# Patient Record
Sex: FEMALE | Race: WHITE
[De-identification: names, ages, dates, MRNs, and addresses within clinical notes are randomized per-mention and may not be internally consistent; named-entity substitution may affect disease eponyms.]

---

## 2017-02-13 ENCOUNTER — HOSPITAL ENCOUNTER (OUTPATIENT)
Dept: HOSPITAL 45 - C.LAB1850 | Age: 82
Discharge: HOME | End: 2017-02-13
Attending: INTERNAL MEDICINE
Payer: COMMERCIAL

## 2017-02-13 DIAGNOSIS — I10: Primary | ICD-10-CM

## 2017-02-13 LAB
ANION GAP SERPL CALC-SCNC: 11 MMOL/L (ref 3–11)
BUN SERPL-MCNC: 26 MG/DL (ref 7–18)
BUN/CREAT SERPL: 21.8 (ref 10–20)
CALCIUM SERPL-MCNC: 8.8 MG/DL (ref 8.5–10.1)
CHLORIDE SERPL-SCNC: 102 MMOL/L (ref 98–107)
CO2 SERPL-SCNC: 26 MMOL/L (ref 21–32)
CREAT SERPL-MCNC: 1.2 MG/DL (ref 0.6–1.2)
GLUCOSE SERPL-MCNC: 77 MG/DL (ref 70–99)
POTASSIUM SERPL-SCNC: 4.1 MMOL/L (ref 3.5–5.1)
SODIUM SERPL-SCNC: 139 MMOL/L (ref 136–145)

## 2017-02-17 ENCOUNTER — HOSPITAL ENCOUNTER (OUTPATIENT)
Dept: HOSPITAL 45 - C.MRI | Age: 82
Discharge: HOME | End: 2017-02-17
Attending: INTERNAL MEDICINE
Payer: COMMERCIAL

## 2017-02-17 DIAGNOSIS — H92.02: ICD-10-CM

## 2017-02-17 DIAGNOSIS — G50.0: Primary | ICD-10-CM

## 2017-02-17 DIAGNOSIS — R25.1: ICD-10-CM

## 2017-02-17 DIAGNOSIS — R20.0: ICD-10-CM

## 2017-02-17 DIAGNOSIS — H91.90: ICD-10-CM

## 2017-02-17 NOTE — DIAGNOSTIC IMAGING REPORT
BRAIN COMBO FOR TRIGEMINAL



CLINICAL HISTORY: Hearing loss. Intermittent left-sided jaw pain and numbness.  

 



COMPARISON STUDY:  MRI of the brain July 12, 2012.



TECHNIQUE: Utilizing a 1.5 Angela magnet and dedicated coil, multiplanar,

multiecho imaging of the brain was performed pre and postcontrast administration

with thin cut imaging through the skull base to evaluate the trigeminal nerves.

Injection of 5.4 cc of Gadavist IV was uneventful.



FINDINGS: There are no areas of restricted diffusion. No acute intracranial

hemorrhage, midline shift or mass effect is present. Ventricular system is

normal. Basilar cisterns are patent. There are no extra-axial collections.

Flow-voids for the major intracranial vessels are present. No mass or abnormal

enhancement is identified along the course of the trigeminal nerves. There is no

evidence for displacement of the trigeminal nerves by vessels. Scattered white

matter T2 hyperintense foci suggest mild small vessel disease. No significant

changes noted since exam of July 12, 2012. Calvarial signal is normal. Orbits

and sinuses are unremarkable. No abnormalities are identified within Meckel's

caves. No intracranial mass is identified.



IMPRESSION:  



1. No acute intracranial findings.



2. No intracranial masses or pathologic enhancement.



3. No abnormalities along the course of the trigeminal nerves.



4. Mild small vessel disease. 









Electronically signed by:  Saji Tiwari M.D.

2/17/2017 10:35 AM



Dictated Date/Time:  2/17/2017 10:28 AM

## 2017-03-06 ENCOUNTER — HOSPITAL ENCOUNTER (OUTPATIENT)
Dept: HOSPITAL 45 - C.CTS | Age: 82
Discharge: HOME | End: 2017-03-06
Attending: INTERNAL MEDICINE
Payer: COMMERCIAL

## 2017-03-06 DIAGNOSIS — R91.8: Primary | ICD-10-CM

## 2017-03-06 NOTE — DIAGNOSTIC IMAGING REPORT
CT OF THE CHEST WITHOUT IV CONTRAST



CLINICAL HISTORY: R91.8 Multiple lung [id number]    



COMPARISON STUDY:  9/12/2016 



CT DOSE: 178.06 mGy.cm



TECHNIQUE:  CT of the thorax was performed from the thoracic inlet to the lung

bases. Images are reviewed in the axial, sagittal, and coronal planes. IV

contrast was not administered for this examination.



FINDINGS:



Thyroid: Imaged portions of the thyroid gland are normal in appearance.



Thoracic aorta: The thoracic aorta is normal in course and caliber, noting

standard 3 vessel arch anatomy.



Heart: The heart is mildly enlarged



Lungs and pleural spaces: There is stable biapical scarring. There is stable low

suspicion point nodules. There is stable atelectasis mild bronchiectasis and

tree-in-bud nodularity within the lingula.



Mediastinum: There is no mediastinal lymphadenopathy.



Carly: There is no nodes of pathologic hilar adenopathy given the limitations of

a noncontrast study



Axilla: Clear.



Upper abdomen:  Partially visualized upper abdominal viscera is within normal

limits.



Skeletal structures: There are no lytic or blastic osseous lesions.



IMPRESSION:  

1. No significant change from the prior study

2. Stable biapical scarring

3. Stable low suspicion subcentimeter pulmonary nodules

4. No evidence of acute parenchymal consolidation







Electronically signed by:  Cy Soni M.D.

3/6/2017 11:01 AM



Dictated Date/Time:  3/6/2017 10:55 AM

## 2017-10-26 ENCOUNTER — HOSPITAL ENCOUNTER (OUTPATIENT)
Dept: HOSPITAL 45 - C.LAB1850 | Age: 82
Discharge: HOME | End: 2017-10-26
Attending: INTERNAL MEDICINE
Payer: COMMERCIAL

## 2017-10-26 DIAGNOSIS — E03.9: ICD-10-CM

## 2017-10-26 DIAGNOSIS — E78.00: Primary | ICD-10-CM

## 2017-10-26 LAB
ALBUMIN/GLOB SERPL: 1 {RATIO} (ref 0.9–2)
ALP SERPL-CCNC: 52 U/L (ref 45–117)
ALT SERPL-CCNC: 24 U/L (ref 12–78)
ANION GAP SERPL CALC-SCNC: 5 MMOL/L (ref 3–11)
AST SERPL-CCNC: 23 U/L (ref 15–37)
BUN SERPL-MCNC: 24 MG/DL (ref 7–18)
BUN/CREAT SERPL: 22.4 (ref 10–20)
CALCIUM SERPL-MCNC: 8.8 MG/DL (ref 8.5–10.1)
CHLORIDE SERPL-SCNC: 106 MMOL/L (ref 98–107)
CHOLEST/HDLC SERPL: 2 {RATIO}
CO2 SERPL-SCNC: 29 MMOL/L (ref 21–32)
CREAT SERPL-MCNC: 1.08 MG/DL (ref 0.6–1.2)
GLOBULIN SER-MCNC: 3.9 GM/DL (ref 2.5–4)
GLUCOSE SERPL-MCNC: 105 MG/DL (ref 70–99)
GLUCOSE UR QL: 104 MG/DL
KETONES UR QL STRIP: 83 MG/DL
NITRITE UR QL STRIP: 110 MG/DL (ref 0–150)
PH UR: 209 MG/DL (ref 0–200)
POTASSIUM SERPL-SCNC: 3.9 MMOL/L (ref 3.5–5.1)
SODIUM SERPL-SCNC: 140 MMOL/L (ref 136–145)
TSH SERPL-ACNC: 1.65 UIU/ML (ref 0.3–4.5)
VERY LOW DENSITY LIPOPROT CALC: 22 MG/DL

## 2017-11-06 ENCOUNTER — HOSPITAL ENCOUNTER (OUTPATIENT)
Dept: HOSPITAL 45 - C.MAMM | Age: 82
Discharge: HOME | End: 2017-11-06
Attending: INTERNAL MEDICINE
Payer: COMMERCIAL

## 2017-11-06 DIAGNOSIS — Z12.31: Primary | ICD-10-CM

## 2017-11-07 NOTE — MAMMOGRAPHY REPORT
BILATERAL DIGITAL SCREENING MAMMOGRAM WITH CAD: 11/6/2017

CLINICAL HISTORY: Routine screening.  Patient has no complaints.  





TECHNIQUE: Bilateral CC and MLO views were obtained.  Current study was also evaluated with a Compute
r Aided Detection (CAD) system.  



COMPARISON: Comparison is made to exams dated:  11/4/2016 mammogram, 11/2/2015 mammogram, 10/31/2014 
mammogram, 10/30/2013 mammogram, 10/29/2012 mammogram, and 5/15/2012 mammogram - [facility].   



BREAST COMPOSITION:  The tissue of both breasts is heterogeneously dense, which may obscure small mas
ses.  



FINDINGS:  There are scattered benign rim calcifications and mild vascular calcifications in both juan
asts.  No suspicious mass, architectural distortion or cluster of microcalcifications is seen.  



IMPRESSION:  ACR BI-RADS CATEGORY 2: BENIGN

There is no mammographic evidence of malignancy. A 1 year screening mammogram is recommended.  The pa
tient will receive written notification of the results.  





Approximately 10% of breast cancers are not detected with mammography. A negative mammographic report
 should not delay biopsy if a clinically suggestive mass is present.



Sarah Vergara M.D.          

ay/:11/6/2017 16:02:49  



Imaging Technologist: VESNA Peres, M, [facility]

letter sent: Normal 1/2  

BI-RADS Code: ACR BI-RADS Category 2: Benign

## 2018-05-15 ENCOUNTER — HOSPITAL ENCOUNTER (OUTPATIENT)
Dept: HOSPITAL 45 - C.LAB1850 | Age: 83
Discharge: HOME | End: 2018-05-15
Attending: INTERNAL MEDICINE
Payer: COMMERCIAL

## 2018-05-15 DIAGNOSIS — I10: Primary | ICD-10-CM

## 2018-05-15 LAB
ALBUMIN SERPL-MCNC: 3.9 GM/DL (ref 3.4–5)
ALP SERPL-CCNC: 49 U/L (ref 45–117)
ALT SERPL-CCNC: 25 U/L (ref 12–78)
AST SERPL-CCNC: 26 U/L (ref 15–37)
BUN SERPL-MCNC: 25 MG/DL (ref 7–18)
CALCIUM SERPL-MCNC: 9.1 MG/DL (ref 8.5–10.1)
CO2 SERPL-SCNC: 27 MMOL/L (ref 21–32)
CREAT SERPL-MCNC: 1.17 MG/DL (ref 0.6–1.2)
GLUCOSE SERPL-MCNC: 93 MG/DL (ref 70–99)
POTASSIUM SERPL-SCNC: 4.1 MMOL/L (ref 3.5–5.1)
PROT SERPL-MCNC: 7.4 GM/DL (ref 6.4–8.2)
SODIUM SERPL-SCNC: 138 MMOL/L (ref 136–145)